# Patient Record
Sex: FEMALE | Race: ASIAN | NOT HISPANIC OR LATINO
[De-identification: names, ages, dates, MRNs, and addresses within clinical notes are randomized per-mention and may not be internally consistent; named-entity substitution may affect disease eponyms.]

---

## 2018-11-09 ENCOUNTER — RESULT REVIEW (OUTPATIENT)
Age: 38
End: 2018-11-09

## 2019-05-09 ENCOUNTER — TRANSCRIPTION ENCOUNTER (OUTPATIENT)
Age: 39
End: 2019-05-09

## 2019-06-20 ENCOUNTER — OUTPATIENT (OUTPATIENT)
Dept: OUTPATIENT SERVICES | Facility: HOSPITAL | Age: 39
LOS: 1 days | End: 2019-06-20
Payer: COMMERCIAL

## 2019-06-20 DIAGNOSIS — Z3A.00 WEEKS OF GESTATION OF PREGNANCY NOT SPECIFIED: ICD-10-CM

## 2019-06-20 DIAGNOSIS — O26.899 OTHER SPECIFIED PREGNANCY RELATED CONDITIONS, UNSPECIFIED TRIMESTER: ICD-10-CM

## 2019-06-20 PROCEDURE — G0463: CPT

## 2019-06-20 PROCEDURE — 59025 FETAL NON-STRESS TEST: CPT

## 2019-06-28 ENCOUNTER — INPATIENT (INPATIENT)
Facility: HOSPITAL | Age: 39
LOS: 1 days | Discharge: HOME | End: 2019-06-30
Attending: OBSTETRICS & GYNECOLOGY | Admitting: OBSTETRICS & GYNECOLOGY
Payer: COMMERCIAL

## 2019-06-28 VITALS
DIASTOLIC BLOOD PRESSURE: 76 MMHG | OXYGEN SATURATION: 96 % | SYSTOLIC BLOOD PRESSURE: 123 MMHG | TEMPERATURE: 99 F | HEART RATE: 74 BPM

## 2019-06-28 DIAGNOSIS — O26.899 OTHER SPECIFIED PREGNANCY RELATED CONDITIONS, UNSPECIFIED TRIMESTER: ICD-10-CM

## 2019-06-28 DIAGNOSIS — Z3A.00 WEEKS OF GESTATION OF PREGNANCY NOT SPECIFIED: ICD-10-CM

## 2019-06-28 DIAGNOSIS — Z34.80 ENCOUNTER FOR SUPERVISION OF OTHER NORMAL PREGNANCY, UNSPECIFIED TRIMESTER: ICD-10-CM

## 2019-06-28 LAB
BASOPHILS # BLD AUTO: 0.1 K/UL — SIGNIFICANT CHANGE UP (ref 0–0.2)
BASOPHILS NFR BLD AUTO: 0.7 % — SIGNIFICANT CHANGE UP (ref 0–2)
BLD GP AB SCN SERPL QL: NEGATIVE — SIGNIFICANT CHANGE UP
EOSINOPHIL # BLD AUTO: 0.2 K/UL — SIGNIFICANT CHANGE UP (ref 0–0.5)
EOSINOPHIL NFR BLD AUTO: 1.2 % — SIGNIFICANT CHANGE UP (ref 0–6)
GLUCOSE BLDC GLUCOMTR-MCNC: 72 MG/DL — SIGNIFICANT CHANGE UP (ref 70–99)
HCT VFR BLD CALC: 40.6 % — SIGNIFICANT CHANGE UP (ref 34.5–45)
HGB BLD-MCNC: 14.6 G/DL — SIGNIFICANT CHANGE UP (ref 11.5–15.5)
LYMPHOCYTES # BLD AUTO: 26.4 % — SIGNIFICANT CHANGE UP (ref 13–44)
LYMPHOCYTES # BLD AUTO: 3.5 K/UL — HIGH (ref 1–3.3)
MCHC RBC-ENTMCNC: 33.5 PG — SIGNIFICANT CHANGE UP (ref 27–34)
MCHC RBC-ENTMCNC: 36.1 GM/DL — HIGH (ref 32–36)
MCV RBC AUTO: 93 FL — SIGNIFICANT CHANGE UP (ref 80–100)
MONOCYTES # BLD AUTO: 0.8 K/UL — SIGNIFICANT CHANGE UP (ref 0–0.9)
MONOCYTES NFR BLD AUTO: 6.2 % — SIGNIFICANT CHANGE UP (ref 2–14)
NEUTROPHILS # BLD AUTO: 8.7 K/UL — HIGH (ref 1.8–7.4)
NEUTROPHILS NFR BLD AUTO: 65.6 % — SIGNIFICANT CHANGE UP (ref 43–77)
PLATELET # BLD AUTO: 229 K/UL — SIGNIFICANT CHANGE UP (ref 150–400)
RBC # BLD: 4.36 M/UL — SIGNIFICANT CHANGE UP (ref 3.8–5.2)
RBC # FLD: 12.5 % — SIGNIFICANT CHANGE UP (ref 10.3–14.5)
RH IG SCN BLD-IMP: POSITIVE — SIGNIFICANT CHANGE UP
T PALLIDUM AB TITR SER: NEGATIVE — SIGNIFICANT CHANGE UP
WBC # BLD: 13.2 K/UL — HIGH (ref 3.8–10.5)
WBC # FLD AUTO: 13.2 K/UL — HIGH (ref 3.8–10.5)

## 2019-06-28 RX ORDER — IBUPROFEN 200 MG
600 TABLET ORAL EVERY 6 HOURS
Refills: 0 | Status: COMPLETED | OUTPATIENT
Start: 2019-06-28 | End: 2020-05-26

## 2019-06-28 RX ORDER — SODIUM CHLORIDE 9 MG/ML
1000 INJECTION, SOLUTION INTRAVENOUS
Refills: 0 | Status: DISCONTINUED | OUTPATIENT
Start: 2019-06-28 | End: 2019-06-30

## 2019-06-28 RX ORDER — DIPHENHYDRAMINE HCL 50 MG
25 CAPSULE ORAL EVERY 6 HOURS
Refills: 0 | Status: DISCONTINUED | OUTPATIENT
Start: 2019-06-28 | End: 2019-06-30

## 2019-06-28 RX ORDER — OXYTOCIN 10 UNIT/ML
333.33 VIAL (ML) INJECTION
Qty: 20 | Refills: 0 | Status: DISCONTINUED | OUTPATIENT
Start: 2019-06-28 | End: 2019-06-30

## 2019-06-28 RX ORDER — LANOLIN
1 OINTMENT (GRAM) TOPICAL EVERY 6 HOURS
Refills: 0 | Status: DISCONTINUED | OUTPATIENT
Start: 2019-06-28 | End: 2019-06-30

## 2019-06-28 RX ORDER — TETANUS TOXOID, REDUCED DIPHTHERIA TOXOID AND ACELLULAR PERTUSSIS VACCINE, ADSORBED 5; 2.5; 8; 8; 2.5 [IU]/.5ML; [IU]/.5ML; UG/.5ML; UG/.5ML; UG/.5ML
0.5 SUSPENSION INTRAMUSCULAR ONCE
Refills: 0 | Status: DISCONTINUED | OUTPATIENT
Start: 2019-06-28 | End: 2019-06-30

## 2019-06-28 RX ORDER — HYDROCORTISONE 1 %
1 OINTMENT (GRAM) TOPICAL EVERY 6 HOURS
Refills: 0 | Status: DISCONTINUED | OUTPATIENT
Start: 2019-06-28 | End: 2019-06-30

## 2019-06-28 RX ORDER — DOCUSATE SODIUM 100 MG
100 CAPSULE ORAL
Refills: 0 | Status: DISCONTINUED | OUTPATIENT
Start: 2019-06-28 | End: 2019-06-30

## 2019-06-28 RX ORDER — SIMETHICONE 80 MG/1
80 TABLET, CHEWABLE ORAL EVERY 4 HOURS
Refills: 0 | Status: DISCONTINUED | OUTPATIENT
Start: 2019-06-28 | End: 2019-06-30

## 2019-06-28 RX ORDER — OXYCODONE HYDROCHLORIDE 5 MG/1
5 TABLET ORAL ONCE
Refills: 0 | Status: DISCONTINUED | OUTPATIENT
Start: 2019-06-28 | End: 2019-06-30

## 2019-06-28 RX ORDER — GLYCERIN ADULT
1 SUPPOSITORY, RECTAL RECTAL AT BEDTIME
Refills: 0 | Status: DISCONTINUED | OUTPATIENT
Start: 2019-06-28 | End: 2019-06-30

## 2019-06-28 RX ORDER — PRAMOXINE HYDROCHLORIDE 150 MG/15G
1 AEROSOL, FOAM RECTAL EVERY 4 HOURS
Refills: 0 | Status: DISCONTINUED | OUTPATIENT
Start: 2019-06-28 | End: 2019-06-30

## 2019-06-28 RX ORDER — SODIUM CHLORIDE 9 MG/ML
3 INJECTION INTRAMUSCULAR; INTRAVENOUS; SUBCUTANEOUS EVERY 8 HOURS
Refills: 0 | Status: DISCONTINUED | OUTPATIENT
Start: 2019-06-28 | End: 2019-06-30

## 2019-06-28 RX ORDER — MORPHINE SULFATE 50 MG/1
4 CAPSULE, EXTENDED RELEASE ORAL ONCE
Refills: 0 | Status: DISCONTINUED | OUTPATIENT
Start: 2019-06-28 | End: 2019-06-28

## 2019-06-28 RX ORDER — DIBUCAINE 1 %
1 OINTMENT (GRAM) RECTAL EVERY 6 HOURS
Refills: 0 | Status: DISCONTINUED | OUTPATIENT
Start: 2019-06-28 | End: 2019-06-30

## 2019-06-28 RX ORDER — OXYCODONE HYDROCHLORIDE 5 MG/1
5 TABLET ORAL
Refills: 0 | Status: DISCONTINUED | OUTPATIENT
Start: 2019-06-28 | End: 2019-06-30

## 2019-06-28 RX ORDER — CITRIC ACID/SODIUM CITRATE 300-500 MG
15 SOLUTION, ORAL ORAL EVERY 6 HOURS
Refills: 0 | Status: DISCONTINUED | OUTPATIENT
Start: 2019-06-28 | End: 2019-06-30

## 2019-06-28 RX ORDER — BENZOCAINE 10 %
1 GEL (GRAM) MUCOUS MEMBRANE EVERY 6 HOURS
Refills: 0 | Status: DISCONTINUED | OUTPATIENT
Start: 2019-06-28 | End: 2019-06-30

## 2019-06-28 RX ORDER — AER TRAVELER 0.5 G/1
1 SOLUTION RECTAL; TOPICAL EVERY 4 HOURS
Refills: 0 | Status: DISCONTINUED | OUTPATIENT
Start: 2019-06-28 | End: 2019-06-30

## 2019-06-28 RX ORDER — MORPHINE SULFATE 50 MG/1
4 CAPSULE, EXTENDED RELEASE ORAL ONCE
Refills: 0 | Status: DISCONTINUED | OUTPATIENT
Start: 2019-06-28 | End: 2019-07-05

## 2019-06-28 RX ORDER — MAGNESIUM HYDROXIDE 400 MG/1
30 TABLET, CHEWABLE ORAL
Refills: 0 | Status: DISCONTINUED | OUTPATIENT
Start: 2019-06-28 | End: 2019-06-30

## 2019-06-28 RX ORDER — IBUPROFEN 200 MG
600 TABLET ORAL EVERY 6 HOURS
Refills: 0 | Status: DISCONTINUED | OUTPATIENT
Start: 2019-06-28 | End: 2019-06-30

## 2019-06-28 RX ORDER — ACETAMINOPHEN 500 MG
975 TABLET ORAL
Refills: 0 | Status: DISCONTINUED | OUTPATIENT
Start: 2019-06-28 | End: 2019-06-30

## 2019-06-28 RX ADMIN — Medication 600 MILLIGRAM(S): at 14:45

## 2019-06-28 RX ADMIN — Medication 975 MILLIGRAM(S): at 17:20

## 2019-06-28 RX ADMIN — Medication 1000 MILLIUNIT(S)/MIN: at 05:26

## 2019-06-28 RX ADMIN — SODIUM CHLORIDE 3 MILLILITER(S): 9 INJECTION INTRAMUSCULAR; INTRAVENOUS; SUBCUTANEOUS at 15:07

## 2019-06-28 RX ADMIN — MORPHINE SULFATE 4 MILLIGRAM(S): 50 CAPSULE, EXTENDED RELEASE ORAL at 05:39

## 2019-06-28 RX ADMIN — Medication 975 MILLIGRAM(S): at 16:50

## 2019-06-28 RX ADMIN — Medication 1000 MILLIUNIT(S)/MIN: at 06:00

## 2019-06-28 RX ADMIN — Medication 1 TABLET(S): at 12:01

## 2019-06-28 RX ADMIN — Medication 600 MILLIGRAM(S): at 23:17

## 2019-06-28 RX ADMIN — SODIUM CHLORIDE 125 MILLILITER(S): 9 INJECTION, SOLUTION INTRAVENOUS at 05:11

## 2019-06-28 RX ADMIN — Medication 600 MILLIGRAM(S): at 14:14

## 2019-06-28 RX ADMIN — Medication 975 MILLIGRAM(S): at 10:02

## 2019-06-28 RX ADMIN — Medication 600 MILLIGRAM(S): at 23:45

## 2019-06-29 LAB
HCT VFR BLD CALC: 36.3 % — SIGNIFICANT CHANGE UP (ref 34.5–45)
HGB BLD-MCNC: 12.2 G/DL — SIGNIFICANT CHANGE UP (ref 11.5–15.5)

## 2019-06-29 RX ADMIN — Medication 975 MILLIGRAM(S): at 02:45

## 2019-06-29 RX ADMIN — Medication 600 MILLIGRAM(S): at 21:24

## 2019-06-29 RX ADMIN — Medication 975 MILLIGRAM(S): at 10:00

## 2019-06-29 RX ADMIN — Medication 600 MILLIGRAM(S): at 22:20

## 2019-06-29 RX ADMIN — Medication 975 MILLIGRAM(S): at 23:49

## 2019-06-29 RX ADMIN — Medication 975 MILLIGRAM(S): at 17:45

## 2019-06-29 RX ADMIN — Medication 600 MILLIGRAM(S): at 15:20

## 2019-06-29 RX ADMIN — Medication 975 MILLIGRAM(S): at 18:15

## 2019-06-29 RX ADMIN — Medication 975 MILLIGRAM(S): at 02:09

## 2019-06-29 RX ADMIN — Medication 600 MILLIGRAM(S): at 07:36

## 2019-06-29 RX ADMIN — Medication 600 MILLIGRAM(S): at 14:53

## 2019-06-29 RX ADMIN — Medication 1 TABLET(S): at 12:25

## 2019-06-29 RX ADMIN — Medication 975 MILLIGRAM(S): at 09:26

## 2019-06-29 RX ADMIN — Medication 100 MILLIGRAM(S): at 21:25

## 2019-06-29 RX ADMIN — Medication 600 MILLIGRAM(S): at 08:00

## 2019-06-29 NOTE — PROGRESS NOTE ADULT - ATTENDING COMMENTS
I have seen and examined this patient.  I agree with the above assessment and plan.  Continue with current care.    Antionette Barajas MD

## 2019-06-29 NOTE — PROGRESS NOTE ADULT - PROBLEM SELECTOR PLAN 1
- Pain well controlled, continue current pain regimen  - Increase ambulation, SCDs when not ambulating  - Continue regular diet    Aneudy Hui, PGY-1

## 2019-06-30 ENCOUNTER — TRANSCRIPTION ENCOUNTER (OUTPATIENT)
Age: 39
End: 2019-06-30

## 2019-06-30 VITALS
SYSTOLIC BLOOD PRESSURE: 108 MMHG | RESPIRATION RATE: 18 BRPM | HEART RATE: 62 BPM | DIASTOLIC BLOOD PRESSURE: 73 MMHG | TEMPERATURE: 99 F

## 2019-06-30 PROCEDURE — 85014 HEMATOCRIT: CPT

## 2019-06-30 PROCEDURE — 86900 BLOOD TYPING SEROLOGIC ABO: CPT

## 2019-06-30 PROCEDURE — 86901 BLOOD TYPING SEROLOGIC RH(D): CPT

## 2019-06-30 PROCEDURE — 85027 COMPLETE CBC AUTOMATED: CPT

## 2019-06-30 PROCEDURE — 59025 FETAL NON-STRESS TEST: CPT

## 2019-06-30 PROCEDURE — 59050 FETAL MONITOR W/REPORT: CPT

## 2019-06-30 PROCEDURE — 86850 RBC ANTIBODY SCREEN: CPT

## 2019-06-30 PROCEDURE — 86780 TREPONEMA PALLIDUM: CPT

## 2019-06-30 PROCEDURE — 85018 HEMOGLOBIN: CPT

## 2019-06-30 PROCEDURE — G0463: CPT

## 2019-06-30 PROCEDURE — 82962 GLUCOSE BLOOD TEST: CPT

## 2019-06-30 RX ORDER — ACETAMINOPHEN 500 MG
3 TABLET ORAL
Qty: 0 | Refills: 0 | DISCHARGE
Start: 2019-06-30

## 2019-06-30 RX ADMIN — Medication 1 TABLET(S): at 12:06

## 2019-06-30 RX ADMIN — Medication 600 MILLIGRAM(S): at 09:48

## 2019-06-30 RX ADMIN — Medication 600 MILLIGRAM(S): at 10:15

## 2019-06-30 RX ADMIN — Medication 975 MILLIGRAM(S): at 12:06

## 2019-06-30 RX ADMIN — Medication 975 MILLIGRAM(S): at 06:08

## 2019-06-30 RX ADMIN — Medication 975 MILLIGRAM(S): at 00:45

## 2019-06-30 RX ADMIN — Medication 975 MILLIGRAM(S): at 12:25

## 2019-06-30 RX ADMIN — Medication 975 MILLIGRAM(S): at 07:00

## 2019-06-30 RX ADMIN — Medication 600 MILLIGRAM(S): at 04:15

## 2019-06-30 RX ADMIN — Medication 100 MILLIGRAM(S): at 09:48

## 2019-06-30 RX ADMIN — Medication 600 MILLIGRAM(S): at 03:17

## 2019-06-30 NOTE — PROGRESS NOTE ADULT - SUBJECTIVE AND OBJECTIVE BOX
[id number] DIAZ    Subjective:   Patient seen and examined at bedside, pain controled, tolerating regular diet. + ambulation/flatus/void.  Denies SOB, CP, Dizziness, minimal lochia.     T(C): 36.9 (06-28-19 @ 12:27), Max: 37.3 (06-28-19 @ 06:30)  HR: 78 (06-28-19 @ 12:27) (74 - 96)  BP: 112/77 (06-28-19 @ 12:27) (111/55 - 136/61)  RR: 18 (06-28-19 @ 12:27) (16 - 18)  SpO2: 94% (06-28-19 @ 08:30) (92% - 100%)    PE:   Gen: NAD  Chest: CTA b/l RRR  abd: soft nt nd, firm fundus below umbilicus  ext: nt calves, 2+ edema, + SCDs      06-27-19 @ 07:01  -  06-28-19 @ 07:00  --------------------------------------------------------  IN: 1500 mL / OUT: 400 mL / NET: 1100 mL    06-28-19 @ 07:01  -  06-28-19 @ 16:51  --------------------------------------------------------  IN: 0 mL / OUT: 785 mL / NET: -785 mL                          14.6   13.2  )-----------( 229      ( 28 Jun 2019 06:04 )             40.6       Assessment/Plan: 38y PPD 0 vaginal delivery complicated by shoulder dystocia and cervical laceration.   VD: Regular diet, po analgesia, routine post partum care  DVT PPx -  Ambulation
MERRILL KELLEY  MRN-[id number]  38y    Subjective:The patient feels well, she is ambulating, tolerating diet, lochia wnl, no HA< no visual changes.    Vital Signs Last 24 Hrs  T(C): 37.1 (30 Jun 2019 05:01), Max: 37.1 (30 Jun 2019 05:01)  T(F): 98.8 (30 Jun 2019 05:01), Max: 98.8 (30 Jun 2019 05:01)  HR: 62 (30 Jun 2019 05:01) (62 - 76)  BP: 108/73 (30 Jun 2019 05:01) (108/73 - 128/85)  BP(mean): --  RR: 18 (30 Jun 2019 05:01) (18 - 18)  SpO2: 98% (29 Jun 2019 17:39) (98% - 98%)    I&O's Summary                            12.2   x     )-----------( x        ( 29 Jun 2019 06:33 )             36.3       Allergies    No Known Allergies    Intolerances        MEDICATIONS  (STANDING):  acetaminophen   Tablet .. 975 milliGRAM(s) Oral <User Schedule>  citric acid/sodium citrate Solution 15 milliLiter(s) Oral every 6 hours  dextrose 5% + lactated ringers. 1000 milliLiter(s) (125 mL/Hr) IV Continuous <Continuous>  diphtheria/tetanus/pertussis (acellular) Vaccine (ADAcel) 0.5 milliLiter(s) IntraMuscular once  ibuprofen  Tablet. 600 milliGRAM(s) Oral every 6 hours  lactated ringers. 1000 milliLiter(s) (125 mL/Hr) IV Continuous <Continuous>  oxytocin Infusion 333.333 milliUNIT(s)/Min (1000 mL/Hr) IV Continuous <Continuous>  oxytocin Infusion 333.333 milliUNIT(s)/Min (1000 mL/Hr) IV Continuous <Continuous>  prenatal multivitamin 1 Tablet(s) Oral daily  sodium chloride 0.9% lock flush 3 milliLiter(s) IV Push every 8 hours    MEDICATIONS  (PRN):  benzocaine 20%/menthol 0.5% Spray 1 Spray(s) Topical every 6 hours PRN for Perineal discomfort  dibucaine 1% Ointment 1 Application(s) Topical every 6 hours PRN Perineal discomfort  diphenhydrAMINE 25 milliGRAM(s) Oral every 6 hours PRN Pruritus  docusate sodium 100 milliGRAM(s) Oral two times a day PRN For stool softening  glycerin Suppository - Adult 1 Suppository(s) Rectal at bedtime PRN Constipation  hydrocortisone 1% Cream 1 Application(s) Topical every 6 hours PRN Moderate Pain (4-6)  lanolin Ointment 1 Application(s) Topical every 6 hours PRN nipple soreness  magnesium hydroxide Suspension 30 milliLiter(s) Oral two times a day PRN Constipation  oxyCODONE    IR 5 milliGRAM(s) Oral every 3 hours PRN Moderate to Severe Pain (4-10)  oxyCODONE    IR 5 milliGRAM(s) Oral once PRN Moderate to Severe Pain (4-10)  pramoxine 1%/zinc 5% Cream 1 Application(s) Topical every 4 hours PRN Moderate Pain (4-6)  simethicone 80 milliGRAM(s) Chew every 4 hours PRN Gas  witch hazel Pads 1 Application(s) Topical every 4 hours PRN Perineal discomfort      PHYSICAL EXAM:    Extremities: non-tender bilateraly, mild edema  Abdomen: soft, nontender, fundus firm  Pelvis: deferred
OB Progress Note:  PPD#1    S: 39yo  PPD#1 s/p  c/b cervical laceration. Patient feels well. Pain is well controlled. She is tolerating a regular diet. She is voiding spontaneously, and ambulating without difficulty. Denies CP/SOB. Denies lightheadedness/dizziness. Denies N/V.    O:  Vitals:  Vital Signs Last 24 Hrs  T(C): 37 (2019 05:00), Max: 37.2 (2019 16:40)  T(F): 98.6 (2019 05:00), Max: 98.9 (2019 16:40)  HR: 70 (2019 05:00) (70 - 96)  BP: 113/72 (2019 05:00) (106/76 - 128/85)  BP(mean): 80 (2019 08:30) (78 - 84)  RR: 18 (2019 05:00) (16 - 18)  SpO2: 99% (2019 05:00) (92% - 99%)    MEDICATIONS  (STANDING):  acetaminophen   Tablet .. 975 milliGRAM(s) Oral <User Schedule>  citric acid/sodium citrate Solution 15 milliLiter(s) Oral every 6 hours  dextrose 5% + lactated ringers. 1000 milliLiter(s) (125 mL/Hr) IV Continuous <Continuous>  diphtheria/tetanus/pertussis (acellular) Vaccine (ADAcel) 0.5 milliLiter(s) IntraMuscular once  ibuprofen  Tablet. 600 milliGRAM(s) Oral every 6 hours  lactated ringers. 1000 milliLiter(s) (125 mL/Hr) IV Continuous <Continuous>  oxytocin Infusion 333.333 milliUNIT(s)/Min (1000 mL/Hr) IV Continuous <Continuous>  oxytocin Infusion 333.333 milliUNIT(s)/Min (1000 mL/Hr) IV Continuous <Continuous>  prenatal multivitamin 1 Tablet(s) Oral daily  sodium chloride 0.9% lock flush 3 milliLiter(s) IV Push every 8 hours      Labs:  Blood type: B Positive  Rubella IgG: RPR: Negative                          12.2   -- >-----------< --    (  @ 06:33 )             36.3                        14.6   13.2<H> >-----------< 229    (  @ 06:04 )             40.6                  Physical Exam:  General: NAD  Abdomen: soft, non-tender, non-distended, fundus firm  Extremities: No erythema/edema

## 2019-06-30 NOTE — DISCHARGE NOTE OB - MEDICATION SUMMARY - MEDICATIONS TO TAKE
I will START or STAY ON the medications listed below when I get home from the hospital:     mg oral tablet  -- 1 tab(s) by mouth every 6 hours  -- Indication: For Pain    acetaminophen 325 mg oral tablet  -- 3 tab(s) by mouth   -- Indication: For Pain    Prenatal Multivitamins with Folic Acid 1 mg oral tablet  -- 1 tab(s) by mouth once a day  -- Indication: For Postpartum

## 2019-06-30 NOTE — PROGRESS NOTE ADULT - ASSESSMENT
PPD2, stable for discharge  Discharge instructions reviewed as written in EMR  She is advised to call MD if fever, severe pain, heavy VB, HA, visual changes.  Will return to office in 6 weeks for a postpartum visit.     Antionette Barajas MD
A/P: 39yo PPD#1 s/p  c/b cervical laceration.  Patient is stable and doing well post-partum.

## 2019-06-30 NOTE — DISCHARGE NOTE OB - CARE PROVIDER_API CALL
Antionette Barajas)  OBSGYN  General  [facility] [id number]  Phone: [phone]  Fax: [phone]  Follow Up Time:

## 2019-06-30 NOTE — DISCHARGE NOTE OB - PATIENT PORTAL LINK FT
You can access the [facility] Patient Portal, offered by [facility], by registering with the following website: [email or web address]

## 2019-06-30 NOTE — DISCHARGE NOTE OB - CARE PLAN
Principal Discharge DX:	Vaginal delivery  Goal:	Rest  Assessment and plan of treatment:	Please call the office to schedule a 2 and 6 week postpartum appointment.

## 2019-06-30 NOTE — DISCHARGE NOTE OB - MATERIALS PROVIDED
Health system Hearing Screen Program/Health system [place] Screening Program/Bottle Feeding Log/Breastfeeding Log/Breastfeeding Mother’s Support Group Information/Guide to Postpartum Care/Birth Certificate Instructions/Tdap Vaccination (VIS Pub Date: 2012)

## 2019-08-09 ENCOUNTER — RESULT REVIEW (OUTPATIENT)
Age: 39
End: 2019-08-09

## 2021-01-05 NOTE — DISCHARGE NOTE OB - AVOID DOUCHING OR TAMPONS UNTIL YOUR POSTPARTUM VISIT
It was a pleasure to meet you. I recommend the following    1. For the constipation, start miralax 1 capful one day for one week then increase to twice a day   2. To help with healing of the ulcer continue to take the prilosec 40mgs twice a day  3. Continue to eat small meals  4. Iron panel and CBC (already ordered) by Dr Milian  5. RTC in 1 month   Statement Selected

## 2022-12-25 NOTE — PATIENT PROFILE OB - POST PARTUM DEPRESSION SCREEN OB 2
no
Deterioration of Condition En Route/Transportation Risk (There is always a risk of traffic delays resulting in deterioration of condition.)